# Patient Record
Sex: MALE | Race: WHITE | NOT HISPANIC OR LATINO | Employment: FULL TIME | URBAN - METROPOLITAN AREA
[De-identification: names, ages, dates, MRNs, and addresses within clinical notes are randomized per-mention and may not be internally consistent; named-entity substitution may affect disease eponyms.]

---

## 2018-10-31 ENCOUNTER — OFFICE VISIT (OUTPATIENT)
Dept: URGENT CARE | Facility: CLINIC | Age: 43
End: 2018-10-31
Payer: COMMERCIAL

## 2018-10-31 VITALS
DIASTOLIC BLOOD PRESSURE: 81 MMHG | WEIGHT: 177 LBS | OXYGEN SATURATION: 97 % | BODY MASS INDEX: 23.46 KG/M2 | HEIGHT: 73 IN | HEART RATE: 70 BPM | TEMPERATURE: 99 F | RESPIRATION RATE: 16 BRPM | SYSTOLIC BLOOD PRESSURE: 133 MMHG

## 2018-10-31 DIAGNOSIS — B35.4 TINEA CORPORIS: Primary | ICD-10-CM

## 2018-10-31 PROCEDURE — 99204 OFFICE O/P NEW MOD 45 MIN: CPT | Mod: S$GLB,,, | Performed by: NURSE PRACTITIONER

## 2018-10-31 RX ORDER — EMTRICITABINE AND TENOFOVIR DISOPROXIL FUMARATE 200; 300 MG/1; MG/1
TABLET, FILM COATED ORAL
Refills: 1 | COMMUNITY
Start: 2018-10-21

## 2018-10-31 RX ORDER — KETOCONAZOLE 20 MG/G
CREAM TOPICAL DAILY
Qty: 15 G | Refills: 0 | Status: SHIPPED | OUTPATIENT
Start: 2018-10-31 | End: 2018-11-14

## 2018-10-31 NOTE — PROGRESS NOTES
"Subjective:       Patient ID: Yuval Macedo is a 43 y.o. male.    Vitals:  height is 6' 1" (1.854 m) and weight is 80.3 kg (177 lb). His temperature is 98.9 °F (37.2 °C). His blood pressure is 133/81 and his pulse is 70. His respiration is 16 and oxygen saturation is 97%.     Chief Complaint: Rash    Patient in for rash that he first noticed 2 weeks ago. First thought it was bites that he was scratching. Patient worried it may be ring worm. denies putting anything on it. Here for vacation. Patient lives in [place]. Originally [place]. On the right lower leg.       Rash   This is a new problem. The current episode started 1 to 4 weeks ago. The problem has been gradually worsening since onset. The affected locations include the right lower leg and right ankle. The rash is characterized by redness, itchiness and dryness. Pertinent negatives include no congestion, cough, fever, joint pain, shortness of breath or sore throat.     Review of Systems   Constitution: Negative for chills and fever.   HENT: Negative for congestion and sore throat.    Respiratory: Negative for cough and shortness of breath.    Skin: Positive for color change, itching and rash.   Musculoskeletal: Negative for joint pain.       Objective:      Physical Exam   Constitutional: He is oriented to person, place, and time. Vital signs are normal. He appears well-developed and well-nourished.  Non-toxic appearance. He does not have a sickly appearance. He does not appear ill. No distress.   HENT:   Head: Normocephalic and atraumatic. Head is without abrasion, without contusion and without laceration.   Right Ear: Hearing, tympanic membrane, external ear and ear canal normal.   Left Ear: Hearing, tympanic membrane, external ear and ear canal normal.   Nose: Nose normal. No mucosal edema or rhinorrhea.   Mouth/Throat: Uvula is midline, oropharynx is clear and moist and mucous membranes are normal. No posterior oropharyngeal edema or posterior " oropharyngeal erythema. Tonsils are 1+ on the right. Tonsils are 1+ on the left. No tonsillar exudate.   Eyes: Conjunctivae, EOM and lids are normal. Pupils are equal, round, and reactive to light.   Neck: Trachea normal, normal range of motion, full passive range of motion without pain and phonation normal. Neck supple.   Cardiovascular: Normal rate, regular rhythm, S1 normal, S2 normal, normal heart sounds and normal pulses.   Pulmonary/Chest: Effort normal and breath sounds normal. No stridor. No respiratory distress. He has no decreased breath sounds. He has no wheezes. He has no rhonchi. He has no rales.   Musculoskeletal: Normal range of motion.   Lymphadenopathy:     He has no cervical adenopathy.   Neurological: He is alert and oriented to person, place, and time.   Skin: Skin is warm, dry and intact. Capillary refill takes less than 2 seconds. Rash noted. No abrasion, no bruising, no burn, no ecchymosis, no laceration, no lesion and no petechiae noted. No erythema.   Pruritic, circular, erythematous, scaling patch noted to the right lower leg (see picture)     Psychiatric: He has a normal mood and affect. His speech is normal and behavior is normal. Judgment and thought content normal. Cognition and memory are normal.   Nursing note and vitals reviewed.            Assessment:       1. Tinea corporis        Plan:         Tinea corporis  -     ketoconazole (NIZORAL) 2 % cream; Apply topically once daily. Apply to the affected area for 14 days  Dispense: 15 g; Refill: 0      Patient Instructions   Rashes  If your condition worsens or fails to improve we recommend that you receive another evaluation at the ER immediately or contact your PCP to discuss your concerns or return here. You must understand that you've received an urgent care treatment only and that you may be released before all your medical problems are known or treated. You the patient will arrange for follouwp care as instructed.   -  Avoid  picking or manipulating the affected areas. Clean the areas twice a day with water and soap.  Apply the topical cream as prescribed.     -  You should seek ER treatment if fever, increase pain, swelling, red streaks from affected area or other signs of increasing infection.     -  Tylenol or ibuprofen can also be used as directed for pain unless you have an allergy to them or medical condition such as stomach ulcers, kidney or liver disease or blood thinners etc for which you should not be taking these type of medications.   If not allergic, please take over the counter Tylenol (Acetaminophen) and/or Motrin (Ibuprofen) as directed for control of pain and/or fever.  Ringworm of the Skin    Ringworm is a fungal infection of the skin. Despite the name, a worm doesn't cause it. The cause of ringworm is a fungus that infects the outer layers of the skin. It is also not caused by bed bugs, scabies, or lice. These are totally different.  The medical term for ringworm is tinea. It can affect most parts of your body, although it seems to do better in moist areas of the body and around hair. It can be on almost any part of your body, including:  · Arms, hands, legs, chest, feet, and back  · Scalp  · Beard  · Groin  · Between the toes  Depending on where it is located, sometimes the name changes:  · Tinea capitis (scalp)  · Tinea cruris (groin)  · Tinea corporis (body)  · Tinea pedis (feet)  Causes  Ringworm is very common all over the world, including the [place] It can take less than 1 week up to 2 weeks before you develop the infection after being exposed. So, you may not figure out the exact cause.  It is spread through direct contact with:  · An infected person or animal  · Infected soil, or objects such as towels, clothing, and tejada  Symptoms  At first you might not notice ringworm. Or you may just see a small, red, often raised itchy spot or pimple. Sometimes there may only be one spot. At other times there may be  several. Ringworm can look slightly different on different parts of the body, but there are some things are always present:  · Irregular, round, oval or ring-shaped, which is why it's called ringworm  · Clearer or lighter color at the center, since it spreads from the center of the spot outward  · Red or inflamed look  · Raised  · Itchy  · Scaly, dry, or flaky  Home care  Follow these tips to help care for yourself at home:  · Leave it alone. Don't scratch at the rash or pick it. This can increase the chance of infection and scarring.  · Take medicine as prescribed. If you were prescribed a cream, apply it exactly as directed. Make sure to put the cream not just on the rash, but also on the skin 1 or 2 inches around it. Medicine by mouth is sometimes needed, particularly for ringworm on the scalp. Take it as directed and until your healthcare provider says to stop.  · Keep it from spreading to others. Untreated ringworm of the skin is contagious by skin-to-skin contact. Your child may return to school 2 days after treatment has started.  Prevention  To some degree, prevention depends on what part of your body was affected. In general, the following good hygiene can help.  · Clean up after you get dirty or sweaty, or after using a locker room.  · When possible, dont share tejada and brushes.  · Avoid having your skin and feet wet or damp for long periods.  · Wear clean, loose-fitting underwear.  Follow-up care  Follow up with your healthcare provider as advised by our staff if the rash does not improve after 10 days of treatment or if the rash spreads to other areas of the body.  When to seek medical advice  Call your healthcare provider right away if any of these occur:  · Redness around the rash gets worse  · Fluid drains from the rash  · Fever of 100.4ºF (38ºC) or higher, or as directed by your healthcare provider  Date Last Reviewed: 8/1/2016  © [address] [place] [address],  [place] [address]. All rights reserved. This information is not intended as a substitute for professional medical care. Always follow your healthcare professional's instructions.

## 2018-10-31 NOTE — PATIENT INSTRUCTIONS
Rashes  If your condition worsens or fails to improve we recommend that you receive another evaluation at the ER immediately or contact your PCP to discuss your concerns or return here. You must understand that you've received an urgent care treatment only and that you may be released before all your medical problems are known or treated. You the patient will arrange for follouwp care as instructed.   -  Avoid picking or manipulating the affected areas. Clean the areas twice a day with water and soap.  Apply the topical cream as prescribed.     -  You should seek ER treatment if fever, increase pain, swelling, red streaks from affected area or other signs of increasing infection.     -  Tylenol or ibuprofen can also be used as directed for pain unless you have an allergy to them or medical condition such as stomach ulcers, kidney or liver disease or blood thinners etc for which you should not be taking these type of medications.   If not allergic, please take over the counter Tylenol (Acetaminophen) and/or Motrin (Ibuprofen) as directed for control of pain and/or fever.  Ringworm of the Skin    Ringworm is a fungal infection of the skin. Despite the name, a worm doesn't cause it. The cause of ringworm is a fungus that infects the outer layers of the skin. It is also not caused by bed bugs, scabies, or lice. These are totally different.  The medical term for ringworm is tinea. It can affect most parts of your body, although it seems to do better in moist areas of the body and around hair. It can be on almost any part of your body, including:  · Arms, hands, legs, chest, feet, and back  · Scalp  · Beard  · Groin  · Between the toes  Depending on where it is located, sometimes the name changes:  · Tinea capitis (scalp)  · Tinea cruris (groin)  · Tinea corporis (body)  · Tinea pedis (feet)  Causes  Ringworm is very common all over the world, including the [place] It can take less than 1 week up to 2 weeks before you  develop the infection after being exposed. So, you may not figure out the exact cause.  It is spread through direct contact with:  · An infected person or animal  · Infected soil, or objects such as towels, clothing, and tejada  Symptoms  At first you might not notice ringworm. Or you may just see a small, red, often raised itchy spot or pimple. Sometimes there may only be one spot. At other times there may be several. Ringworm can look slightly different on different parts of the body, but there are some things are always present:  · Irregular, round, oval or ring-shaped, which is why it's called ringworm  · Clearer or lighter color at the center, since it spreads from the center of the spot outward  · Red or inflamed look  · Raised  · Itchy  · Scaly, dry, or flaky  Home care  Follow these tips to help care for yourself at home:  · Leave it alone. Don't scratch at the rash or pick it. This can increase the chance of infection and scarring.  · Take medicine as prescribed. If you were prescribed a cream, apply it exactly as directed. Make sure to put the cream not just on the rash, but also on the skin 1 or 2 inches around it. Medicine by mouth is sometimes needed, particularly for ringworm on the scalp. Take it as directed and until your healthcare provider says to stop.  · Keep it from spreading to others. Untreated ringworm of the skin is contagious by skin-to-skin contact. Your child may return to school 2 days after treatment has started.  Prevention  To some degree, prevention depends on what part of your body was affected. In general, the following good hygiene can help.  · Clean up after you get dirty or sweaty, or after using a locker room.  · When possible, dont share tejada and brushes.  · Avoid having your skin and feet wet or damp for long periods.  · Wear clean, loose-fitting underwear.  Follow-up care  Follow up with your healthcare provider as advised by our staff if the rash does not improve after 10  days of treatment or if the rash spreads to other areas of the body.  When to seek medical advice  Call your healthcare provider right away if any of these occur:  · Redness around the rash gets worse  · Fluid drains from the rash  · Fever of 100.4ºF (38ºC) or higher, or as directed by your healthcare provider  Date Last Reviewed: 8/1/2016  © 2113-1045 [employer]. [address] [place] [address], [place] [address]. All rights reserved. This information is not intended as a substitute for professional medical care. Always follow your healthcare professional's instructions.

## 2018-11-03 ENCOUNTER — TELEPHONE (OUTPATIENT)
Dept: URGENT CARE | Facility: CLINIC | Age: 43
End: 2018-11-03